# Patient Record
Sex: FEMALE | Race: WHITE
[De-identification: names, ages, dates, MRNs, and addresses within clinical notes are randomized per-mention and may not be internally consistent; named-entity substitution may affect disease eponyms.]

---

## 2017-03-05 ENCOUNTER — HOSPITAL ENCOUNTER (EMERGENCY)
Dept: HOSPITAL 62 - ER | Age: 42
LOS: 1 days | Discharge: HOME | End: 2017-03-06
Payer: OTHER GOVERNMENT

## 2017-03-05 DIAGNOSIS — M79.1: ICD-10-CM

## 2017-03-05 DIAGNOSIS — R51: ICD-10-CM

## 2017-03-05 DIAGNOSIS — R50.9: ICD-10-CM

## 2017-03-05 DIAGNOSIS — Z88.5: ICD-10-CM

## 2017-03-05 DIAGNOSIS — Z88.0: ICD-10-CM

## 2017-03-05 DIAGNOSIS — R42: ICD-10-CM

## 2017-03-05 DIAGNOSIS — R00.0: ICD-10-CM

## 2017-03-05 DIAGNOSIS — J40: Primary | ICD-10-CM

## 2017-03-05 DIAGNOSIS — R05: ICD-10-CM

## 2017-03-05 DIAGNOSIS — N39.0: ICD-10-CM

## 2017-03-05 LAB
ALBUMIN SERPL-MCNC: 4.1 G/DL (ref 3.5–5)
ALP SERPL-CCNC: 95 U/L (ref 38–126)
ALT SERPL-CCNC: 43 U/L (ref 9–52)
ANION GAP SERPL CALC-SCNC: 14 MMOL/L (ref 5–19)
APPEARANCE UR: CLEAR
AST SERPL-CCNC: 24 U/L (ref 14–36)
BASOPHILS NFR BLD MANUAL: 0 % (ref 0–2)
BILIRUB DIRECT SERPL-MCNC: 0 MG/DL (ref 0–0.3)
BILIRUB SERPL-MCNC: 0.5 MG/DL (ref 0.2–1.3)
BILIRUB UR QL STRIP: NEGATIVE
BUN SERPL-MCNC: 9 MG/DL (ref 7–20)
CALCIUM: 9.1 MG/DL (ref 8.4–10.2)
CHLORIDE SERPL-SCNC: 96 MMOL/L (ref 98–107)
CO2 SERPL-SCNC: 26 MMOL/L (ref 22–30)
CREAT SERPL-MCNC: 0.82 MG/DL (ref 0.52–1.25)
EOSINOPHIL NFR BLD MANUAL: 0 % (ref 0–6)
ERYTHROCYTE [DISTWIDTH] IN BLOOD BY AUTOMATED COUNT: 12.7 % (ref 11.5–14)
GLUCOSE SERPL-MCNC: 118 MG/DL (ref 75–110)
GLUCOSE UR STRIP-MCNC: NEGATIVE MG/DL
HCT VFR BLD CALC: 44.8 % (ref 36–47)
HGB BLD-MCNC: 14.7 G/DL (ref 12–15.5)
HGB HCT DIFFERENCE: -0.7
KETONES UR STRIP-MCNC: NEGATIVE MG/DL
MCH RBC QN AUTO: 27.8 PG (ref 27–33.4)
MCHC RBC AUTO-ENTMCNC: 32.9 G/DL (ref 32–36)
MCV RBC AUTO: 85 FL (ref 80–97)
NEUTS BAND NFR BLD MANUAL: 1 % (ref 3–5)
NITRITE UR QL STRIP: NEGATIVE
PH UR STRIP: 6 [PH] (ref 5–9)
POTASSIUM SERPL-SCNC: 3.9 MMOL/L (ref 3.6–5)
PROT SERPL-MCNC: 7.5 G/DL (ref 6.3–8.2)
PROT UR STRIP-MCNC: NEGATIVE MG/DL
RBC # BLD AUTO: 5.29 10^6/UL (ref 3.72–5.28)
RBC MORPH BLD: (no result)
SODIUM SERPL-SCNC: 135.7 MMOL/L (ref 137–145)
SP GR UR STRIP: 1
TOTAL CELLS COUNTED BLD: 100
TOXIC GRANULES BLD QL SMEAR: (no result)
UROBILINOGEN UR-MCNC: NEGATIVE MG/DL (ref ?–2)
VARIANT LYMPHS NFR BLD MANUAL: 11 % (ref 13–45)
WBC # BLD AUTO: 22.6 10^3/UL (ref 4–10.5)
WBC TOXIC VACUOLES BLD QL SMEAR: PRESENT

## 2017-03-05 PROCEDURE — 80053 COMPREHEN METABOLIC PANEL: CPT

## 2017-03-05 PROCEDURE — 81001 URINALYSIS AUTO W/SCOPE: CPT

## 2017-03-05 PROCEDURE — 94640 AIRWAY INHALATION TREATMENT: CPT

## 2017-03-05 PROCEDURE — 96374 THER/PROPH/DIAG INJ IV PUSH: CPT

## 2017-03-05 PROCEDURE — 70450 CT HEAD/BRAIN W/O DYE: CPT

## 2017-03-05 PROCEDURE — 99284 EMERGENCY DEPT VISIT MOD MDM: CPT

## 2017-03-05 PROCEDURE — 96361 HYDRATE IV INFUSION ADD-ON: CPT

## 2017-03-05 PROCEDURE — 87040 BLOOD CULTURE FOR BACTERIA: CPT

## 2017-03-05 PROCEDURE — 36415 COLL VENOUS BLD VENIPUNCTURE: CPT

## 2017-03-05 PROCEDURE — 96375 TX/PRO/DX INJ NEW DRUG ADDON: CPT

## 2017-03-05 PROCEDURE — 71020: CPT

## 2017-03-05 PROCEDURE — 85025 COMPLETE CBC W/AUTO DIFF WBC: CPT

## 2017-03-05 PROCEDURE — 87804 INFLUENZA ASSAY W/OPTIC: CPT

## 2017-03-05 RX ADMIN — SODIUM CHLORIDE PRN ML: 9 INJECTION, SOLUTION INTRAVENOUS at 21:35

## 2017-03-05 RX ADMIN — SODIUM CHLORIDE PRN ML: 9 INJECTION, SOLUTION INTRAVENOUS at 23:30

## 2017-03-05 NOTE — ER DOCUMENT REPORT
ED Medical Screen (RME)





- General


Chief Complaint: Fever


Stated Complaint: FEVER, COUGH, DIZZINESS


Notes: 


Patient states that she was sick last week with a low-grade fever and cough and 

cold.  States was doing fine this morning then all of a sudden she developed a 

fever, cough, congestion, and dizziness.  Denies nausea vomiting or diarrhea.  

Does complain of shortness of breath with coughing.





I have greeted and performed a rapid initial assessment of this patient.  A 

comprehensive ED assessment and evaluation of the patient, analysis of test 

results and completion of the medical decision making process will be conducted 

by additional ED providers.


TRAVEL OUTSIDE OF THE [place] IN LAST 30 DAYS: No





- Related Data


Allergies/Adverse Reactions: 


 





codeine [Codeine] Allergy (Verified 03/05/17 19:51)


 


hydrocodone [Hydrocodone] Allergy (Verified 03/05/17 19:51)


 


lactose [Lactose] Allergy (Verified 03/05/17 19:51)


 


Penicillins Allergy (Verified 03/05/17 19:51)


 











Past Medical History





- Past Medical History


Cardiac Medical History: Reports: Hx Hypercholesterolemia


Renal/ Medical History: Denies: Hx Peritoneal Dialysis


Psychiatric Medical History: Reports: Hx Depression


Past Surgical History: Reports: Hx Cholecystectomy





- Immunizations


Hx Diphtheria, Pertussis, Tetanus Vaccination: No





Physical Exam





- Vital signs


Vitals: 





 











Temp Pulse Resp BP Pulse Ox


 


 98.5 F   121 H  20   135/90 H  97 


 


 03/05/17 19:48  03/05/17 19:48  03/05/17 19:48  03/05/17 19:48  03/05/17 19:48














- Respiratory


Respiratory status: No respiratory distress - Mild expiratory wheezing noted 

left upper lobe.  No respiratory distress.





Course





- Vital Signs


Vital signs: 





 











Temp Pulse Resp BP Pulse Ox


 


 98.5 F   121 H  20   135/90 H  97 


 


 03/05/17 19:48  03/05/17 19:48  03/05/17 19:48  03/05/17 19:48  03/05/17 19:48

## 2017-03-05 NOTE — ER DOCUMENT REPORT
ED General





- General


Chief Complaint: Fever


Stated Complaint: FEVER, COUGH, DIZZINESS


Time seen by provider: 20:24


Mode of Arrival: Ambulatory


Information source: Patient


TRAVEL OUTSIDE OF THE [place] IN LAST 30 DAYS: No





- HPI


Patient complains to provider of: fever, dizziness, headache, nonproductive 

cough


Onset: Last week


Onset/Duration: Intermittent


Quality of pain: Achy


Severity: Mild


Pain Level: 2


Associated symptoms: Body/muscle aches, Chills, Nonproductive cough, Fever, 

Headache


Exacerbated by: Denies


Relieved by: Denies


Similar symptoms previously: Yes


Recently seen / treated by doctor: No


Notes: 


Patient is a 41-year-old female who presents to the emergency room complaining 

of fever, headache, nonproductive cough, dizziness, symptoms started last week, 

then went away for to 3 days, and came back yesterday, she denies any nausea, 

vomiting, diarrhea, no abdominal pain, no sick contacts, no recent traveling, 

patient denies stiff neck or neck pain, headache is in the frontal region





- Related Data


Allergies/Adverse Reactions: 


 





codeine [Codeine] Allergy (Verified 03/05/17 19:51)


 


hydrocodone [Hydrocodone] Allergy (Verified 03/05/17 19:51)


 


lactose [Lactose] Allergy (Verified 03/05/17 19:51)


 


Penicillins Allergy (Verified 03/05/17 19:51)


 











Past Medical History





- General


Information source: Patient





- Social History


Smoking Status: Unknown if Ever Smoked


Family History: Reviewed & Not Pertinent


Patient has suicidal ideation: No


Patient has homicidal ideation: No





- Past Medical History


Cardiac Medical History: Reports: Hx Hypercholesterolemia


Renal/ Medical History: Denies: Hx Peritoneal Dialysis


Psychiatric Medical History: Reports: Hx Depression


Past Surgical History: Reports: Hx Cholecystectomy





- Immunizations


Hx Diphtheria, Pertussis, Tetanus Vaccination: No





Review of Systems





- Review of Systems


Constitutional: See HPI


EENT: No symptoms reported


Cardiovascular: No symptoms reported


Respiratory: Cough


Gastrointestinal: No symptoms reported


Genitourinary: No symptoms reported


Female Genitourinary: No symptoms reported


Musculoskeletal: See HPI


Skin: No symptoms reported


Hematologic/Lymphatic: No symptoms reported


Neurological/Psychological: Headaches


-: Yes All other systems reviewed and negative





Physical Exam





- Vital signs


Vitals: 


 











Temp Pulse Resp BP Pulse Ox


 


 98.5 F   121 H  20   135/90 H  97 


 


 03/05/17 19:48  03/05/17 19:48  03/05/17 19:48  03/05/17 19:48  03/05/17 19:48











Interpretation: Tachycardic





- General


General appearance: Appears well, Alert





- HEENT


Head: Normocephalic, Atraumatic


Eyes: Normal


Conjunctiva: Normal


Extraocular movements intact: Yes


Eyelashes: Normal


Pupils: PERRL


Ears: Normal


External canal: Normal


Tympanic membrane: Normal


Sinus: Frontal, Tenderness


Nasal: Normal


Mouth/Lips: Normal


Mucous membranes: Normal


Pharynx: Normal





- Respiratory


Respiratory status: No respiratory distress


Chest status: Nontender


Breath sounds: Normal


Chest palpation: Normal





- Cardiovascular


Rhythm: Regular


Heart sounds: Normal auscultation


Murmur: No





- Abdominal


Inspection: Normal


Distension: No distension


Bowel sounds: Normal


Tenderness: Nontender


Organomegaly: No organomegaly





- Back


Back: Normal, Nontender





- Extremities


General upper extremity: Normal inspection, Nontender, Normal color, Normal ROM

, Normal temperature


General lower extremity: Normal inspection, Nontender, Normal color, Normal ROM

, Normal temperature, Normal weight bearing.  No: Luisa's sign





- Neurological


Neuro grossly intact: Yes


Cognition: Normal


Orientation: AAOx4


Velma Coma Scale Eye Opening: Spontaneous


Albright Coma Scale Verbal: Oriented


Albright Coma Scale Motor: Obeys Commands


Velma Coma Scale Total: 15


Speech: Normal


Motor strength normal: LUE, RUE, LLE, RLE


Sensory: Normal





- Psychological


Associated symptoms: Normal affect, Normal mood





- Skin


Skin Temperature: Warm


Skin Moisture: Dry


Skin Color: Normal





Course





- Re-evaluation


Re-evalutation: 


03/05/17 23:58


Patient resting comfortably, reports feeling much better, lab and imaging 

findings were discussed with patient at bedside, she is noted to have mild 

urinary tract infection, also has upper respiratory symptoms, possible sinusitis

, therefore she was started on Levaquin, advised to get plenty of rest, drink 

plenty fluids, follow up with her primary care provider or return if symptoms 

worsen, patient acknowledges understanding and agreement with this plan











- Vital Signs


Vital signs: 


 











Temp Pulse Resp BP Pulse Ox


 


 98.5 F   121 H  15   134/88 H  98 


 


 03/05/17 19:48  03/05/17 19:48  03/06/17 00:01  03/06/17 00:01  03/06/17 00:01














- Laboratory


Result Diagrams: 


 03/05/17 21:30





 03/05/17 21:30


Laboratory results interpreted by me: 


 











  03/05/17 03/05/17 03/05/17





  21:30 21:30 23:07


 


WBC  22.6 H  


 


RBC  5.29 H  


 


Seg Neuts % (Manual)  85 H  


 


Band Neutrophils %  1 L  


 


Lymphocytes % (Manual)  11 L  


 


Abs Neuts (Manual)  19.4 H  


 


Sodium   135.7 L 


 


Chloride   96 L 


 


Glucose   118 H 


 


Urine Blood    SMALL H


 


Ur Leukocyte Esterase    TRACE H














- Diagnostic Test


Radiology reviewed: Image reviewed, Reports reviewed





Discharge





- Discharge


Clinical Impression: 


 Bronchitis





Urinary tract infection


Qualifiers:


 Urinary tract infection type: site unspecified Hematuria presence: without 

hematuria Qualified Code(s): N39.0 - Urinary tract infection, site not specified





Condition: Stable


Disposition: HOME, SELF-CARE


Instructions:  Urinary Tract Infection ([facility]), Bronchitis ([facility])


Additional Instructions: 


Follow up with your primary care provider in one to 2 days.  Return to the 

emergency room immediately if symptoms worsen or any additional concerns.


Prescriptions: 


Levofloxacin [Levaquin 750 mg Tablet] 750 mg PO DAILY #6 tablet


Forms:  Return to Work

## 2017-03-06 VITALS — SYSTOLIC BLOOD PRESSURE: 142 MMHG | DIASTOLIC BLOOD PRESSURE: 84 MMHG

## 2017-09-17 ENCOUNTER — HOSPITAL ENCOUNTER (EMERGENCY)
Dept: HOSPITAL 62 - ER | Age: 42
Discharge: HOME | End: 2017-09-17
Payer: OTHER GOVERNMENT

## 2017-09-17 VITALS — SYSTOLIC BLOOD PRESSURE: 139 MMHG | DIASTOLIC BLOOD PRESSURE: 88 MMHG

## 2017-09-17 DIAGNOSIS — S93.401A: Primary | ICD-10-CM
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
## 2017-09-17 DIAGNOSIS — M25.551: ICD-10-CM

## 2017-09-17 DIAGNOSIS — M25.571: ICD-10-CM

## 2017-09-17 PROCEDURE — L1902 AFO ANKLE GAUNTLET PRE OTS: HCPCS

## 2017-09-17 PROCEDURE — 73610 X-RAY EXAM OF ANKLE: CPT

## 2017-09-17 PROCEDURE — 29515 APPLICATION SHORT LEG SPLINT: CPT

## 2017-09-17 PROCEDURE — 99283 EMERGENCY DEPT VISIT LOW MDM: CPT

## 2017-09-17 PROCEDURE — 2W3QX1Z IMMOBILIZATION OF RIGHT LOWER LEG USING SPLINT: ICD-10-PCS | Performed by: EMERGENCY MEDICINE

## 2017-09-17 PROCEDURE — 73502 X-RAY EXAM HIP UNI 2-3 VIEWS: CPT

## 2017-09-17 NOTE — ER DOCUMENT REPORT
HPI





- HPI


Patient complains to provider of: fell off horse


Onset: Yesterday - after lunch


Onset/Duration: Sudden


Quality of pain: Throbbing


Pain Level: 4


Context: 





42-year-old female fell off her horse about 5 feet onto her right hip and 

twisted her right ankle.  She was able to continue to walk with her riding 

boot.  She is more sore this morning.  She works in the business office of 

[employer].  She want to make sure nothing was broken with x-rays 

today.  She has crutches at home and does not want any crutches from the 

emergency department.


Associated Symptoms: None


Exacerbated by: Walking


Relieved by: Denies


Similar symptoms previously: No


Recently seen / treated by doctor: No





- ROS


ROS below otherwise negative: Yes


Systems Reviewed and Negative: Yes All other systems reviewed and negative





- REPRODUCTIVE


LMP: 08/30/2017


Reproductive: DENIES: Pregnant:





Past Medical History





- General


Information source: Patient





- Social History


Smoking Status: Never Smoker


Frequency of alcohol use: Occasional


Drug Abuse: None


Lives with: Family


Family History: Reviewed & Not Pertinent





- Past Medical History


Cardiac Medical History: Reports: Hx Hypercholesterolemia


Renal/ Medical History: Denies: Hx Peritoneal Dialysis


Psychiatric Medical History: Reports: Hx Depression


Past Surgical History: Reports: Hx Cholecystectomy





- Immunizations


Hx Diphtheria, Pertussis, Tetanus Vaccination: No





Vertical Provider Document





- CONSTITUTIONAL


Agree With Documented VS: Yes


Exam Limitations: No Limitations


General Appearance: No Apparent Distress





- INFECTION CONTROL


TRAVEL OUTSIDE OF THE [place] IN LAST 30 DAYS: No





- HEENT


HEENT: Normocephalic





- NECK


Neck: Supple





- RESPIRATORY


O2 Sat by Pulse Oximetry: 96





- MUSCULOSKELETAL/EXTREMETIES


Musculoskeletal/Extremeties: MAEW, FROM, Tender, Edema, Eccymosis - inferior to 

lateral right malleolus, non tender bone


Notes: 





tender right lateral hip, FROM, no bruise





- NEURO


Level of Consciousness: Awake, Alert, Appropriate


Motor/Sensory: No Motor Deficit, No Sensory Deficit





- DERM


Integumentary: Warm, Dry





Course





- Re-evaluation


Re-evalutation: 





09/17/17 06:46


prelim ankle xray is negative, hip neg per rad





- Vital Signs


Vital signs: 


 











Temp Pulse Resp BP Pulse Ox


 


 98.2 F   99   16   139/88 H  96 


 


 09/17/17 05:40  09/17/17 05:40  09/17/17 05:40  09/17/17 05:40  09/17/17 05:40














Procedures





- Immobilization


  ** Right Ankle


Time completed: 06:33


Pre-Proc Neuro Vasc Exam: Normal


Immobilizer type: Ankle stirrup


Performed by: PCT


Post-Proc Neuro Vasc Exam: Normal


Alignment checked and good: Yes





Discharge





- Discharge


Clinical Impression: 


 right hip contusion





Right ankle sprain


Qualifiers:


 Encounter type: initial encounter Involved ligament of ankle: unspecified 

ligament Qualified Code(s): S93.401A - Sprain of unspecified ligament of right 

ankle, initial encounter





Condition: Good


Disposition: HOME, SELF-CARE


Instructions:  Sprained Ankle ([facility]), Contusion ([facility]), Ankle Stirrup Splint ([facility])


Additional Instructions: 


splint this week


final ankle xray is pending, prelim is negative


to er any concerns


motrin for pain





Please complete the patient satisfaction survey if you get one, and return it.. 

If you do not receive a survey,  then you can go to the [facility] website, [email or web address] 

and place your comments about your very good care. Thank you very much. It was 

a pleasure being your medical provider today.


Referrals: 


RENALDO FERRARA MD [ACTIVE STAFF] - Follow up as needed

## 2017-09-17 NOTE — RADIOLOGY REPORT (SQ)
EXAM DESCRIPTION:  ANKLE RIGHT COMPLETE



COMPLETED DATE/TIME:  9/17/2017 6:16 am



REASON FOR STUDY:  fall injury .  Pain right lateral malleolus status post fall off the horse.



COMPARISON:  Right ankle x-ray 1/6/2011.  Right foot x-ray 7/12/2010.



NUMBER OF VIEWS:  Three views.



TECHNIQUE:  AP, lateral, and oblique radiographic images acquired of the right ankle.



LIMITATIONS:  None.



FINDINGS:  MINERALIZATION: Normal.

BONES: No evidence for acute fracture or dislocation.  Redemonstration of linear lucency at the dorsu
m of the navicular bone, probably due to remote trauma.  Bony spur at the calcaneus.

JOINTS: Small effusion at the ankle.

SOFT TISSUES: Mild soft tissue swelling at the ankle.



IMPRESSION:  No radiographic evidence of acute fracture.

Small joint effusion at the ankle.  Mild soft tissue swelling.  If there is clinical concern for liga
mentous injury, evaluation with MRI can be obtained.



TECHNICAL DOCUMENTATION:  JOB ID:  [id number]

OH-64

 2011 [employer]- All Rights Reserved

## 2017-09-17 NOTE — RADIOLOGY REPORT (SQ)
EXAM DESCRIPTION:  HIP RIGHT AP/LATERAL



COMPLETED DATE/TIME:  9/17/2017 6:16 am



REASON FOR STUDY:  fall injury



COMPARISON:  None.



NUMBER OF VIEWS:  Two views.



TECHNIQUE:  AP pelvis and additional frog-leg view of the right hip.



LIMITATIONS:  None.



FINDINGS:  There is no acute fracture or dislocation.  The pelvic ring is intact.  The bilateral hip 
joints are maintained.  The soft tissues are unremarkable.  No radiopaque foreign body.



IMPRESSION:  No radiographic evidence of acute injury.



TECHNICAL DOCUMENTATION:  JOB ID:  [id number]

OH-64

 2011 [employer]- All Rights Reserved

## 2018-07-26 ENCOUNTER — HOSPITAL ENCOUNTER (EMERGENCY)
Dept: HOSPITAL 62 - ER | Age: 43
Discharge: HOME | End: 2018-07-26
Payer: COMMERCIAL

## 2018-07-26 VITALS — DIASTOLIC BLOOD PRESSURE: 97 MMHG | SYSTOLIC BLOOD PRESSURE: 182 MMHG

## 2018-07-26 DIAGNOSIS — Z90.49: ICD-10-CM

## 2018-07-26 DIAGNOSIS — E78.00: ICD-10-CM

## 2018-07-26 DIAGNOSIS — K83.8: ICD-10-CM

## 2018-07-26 DIAGNOSIS — Z88.6: ICD-10-CM

## 2018-07-26 DIAGNOSIS — Z88.0: ICD-10-CM

## 2018-07-26 DIAGNOSIS — R10.11: Primary | ICD-10-CM

## 2018-07-26 LAB
ADD MANUAL DIFF: NO
ALBUMIN SERPL-MCNC: 4 G/DL (ref 3.5–5)
ALP SERPL-CCNC: 76 U/L (ref 38–126)
ALT SERPL-CCNC: 20 U/L (ref 9–52)
ANION GAP SERPL CALC-SCNC: 16 MMOL/L (ref 5–19)
AST SERPL-CCNC: 19 U/L (ref 14–36)
BASOPHILS # BLD AUTO: 0.1 10^3/UL (ref 0–0.2)
BASOPHILS NFR BLD AUTO: 0.7 % (ref 0–2)
BILIRUB DIRECT SERPL-MCNC: 0.2 MG/DL (ref 0–0.4)
BILIRUB SERPL-MCNC: 0.2 MG/DL (ref 0.2–1.3)
BUN SERPL-MCNC: 12 MG/DL (ref 7–20)
CALCIUM: 9.6 MG/DL (ref 8.4–10.2)
CHLORIDE SERPL-SCNC: 103 MMOL/L (ref 98–107)
CO2 SERPL-SCNC: 25 MMOL/L (ref 22–30)
EOSINOPHIL # BLD AUTO: 0.3 10^3/UL (ref 0–0.6)
EOSINOPHIL NFR BLD AUTO: 2.5 % (ref 0–6)
ERYTHROCYTE [DISTWIDTH] IN BLOOD BY AUTOMATED COUNT: 13.5 % (ref 11.5–14)
GLUCOSE SERPL-MCNC: 117 MG/DL (ref 75–110)
HCT VFR BLD CALC: 42.2 % (ref 36–47)
HGB BLD-MCNC: 14.5 G/DL (ref 12–15.5)
LIPASE SERPL-CCNC: 136.8 U/L (ref 23–300)
LYMPHOCYTES # BLD AUTO: 3.2 10^3/UL (ref 0.5–4.7)
LYMPHOCYTES NFR BLD AUTO: 31.7 % (ref 13–45)
MCH RBC QN AUTO: 29.3 PG (ref 27–33.4)
MCHC RBC AUTO-ENTMCNC: 34.4 G/DL (ref 32–36)
MCV RBC AUTO: 85 FL (ref 80–97)
MONOCYTES # BLD AUTO: 0.6 10^3/UL (ref 0.1–1.4)
MONOCYTES NFR BLD AUTO: 6 % (ref 3–13)
NEUTROPHILS # BLD AUTO: 6.1 10^3/UL (ref 1.7–8.2)
NEUTS SEG NFR BLD AUTO: 59.1 % (ref 42–78)
PLATELET # BLD: 397 10^3/UL (ref 150–450)
POTASSIUM SERPL-SCNC: 4.4 MMOL/L (ref 3.6–5)
PROT SERPL-MCNC: 7.7 G/DL (ref 6.3–8.2)
RBC # BLD AUTO: 4.94 10^6/UL (ref 3.72–5.28)
SODIUM SERPL-SCNC: 143.5 MMOL/L (ref 137–145)
TOTAL CELLS COUNTED % (AUTO): 100 %
WBC # BLD AUTO: 10.2 10^3/UL (ref 4–10.5)

## 2018-07-26 PROCEDURE — 80076 HEPATIC FUNCTION PANEL: CPT

## 2018-07-26 PROCEDURE — 80048 BASIC METABOLIC PNL TOTAL CA: CPT

## 2018-07-26 PROCEDURE — 96372 THER/PROPH/DIAG INJ SC/IM: CPT

## 2018-07-26 PROCEDURE — 76705 ECHO EXAM OF ABDOMEN: CPT

## 2018-07-26 PROCEDURE — 83690 ASSAY OF LIPASE: CPT

## 2018-07-26 PROCEDURE — 99284 EMERGENCY DEPT VISIT MOD MDM: CPT

## 2018-07-26 PROCEDURE — 85025 COMPLETE CBC W/AUTO DIFF WBC: CPT

## 2018-07-26 PROCEDURE — 36415 COLL VENOUS BLD VENIPUNCTURE: CPT

## 2018-07-26 NOTE — ER DOCUMENT REPORT
ED GI/





- General


Chief Complaint: Abdominal Pain


Stated Complaint: ABDOMINAL PAIN


Time Seen by Provider: 07/26/18 14:51


Mode of Arrival: Ambulatory


Information source: Patient


Cannot obtain history due to: Unstable vital signs


Notes: 





Chief complaint: abdominal pain:








History of complain:( obtained from----patient) 43 years old female with a 

history of cholecystectomy, 8 cheeseburger started having right upper quadrant 

pain which was very sharp, 10/10 intensity, therefore presented to the ED.


Pain was associated with nausea no vomiting.  No fever chills or other 

constitutional symptoms








Onset: As above


Duration: Sudden last few hours


Severity: Moderate to severe


Quality: Sharp


Context: Unknown


Exacerbating factor and relieving factors: None











REVIEW OF SYSTEMS:


CONSTITUTIONAL :  Denies fever,  chills, or sweats.  Denies recent illness.


EENT:   Denies eye, ear, throat, or mouth pain or symptoms.  Denies nasal or 

sinus congestion or discharge.  Denies throat, tongue, or mouth swelling or 

difficulty swallowing.


CARDIOVASCULAR:  Denies chest pain.  Denies palpitations or racing or irregular 

heart beat.  Denies ankle edema.


RESPIRATORY:  Denies cough, cold, or chest congestion.  Denies shortness of 

breath, difficulty breathing, or wheezing.


GASTROINTESTINAL:  Denies  distention.  Denies , vomiting, or diarrhea.  Denies 

blood in vomitus, stools, or per rectum.  Denies black, tarry stools.  Denies 

constipation. 


GENITOURINARY:  Denies difficulty urinating, painful urination, burning, 

frequency, blood in urine, or discharge.


FEMALE  GENITOURINARY:  Denies vaginal bleeding, heavy or abnormal periods, 

irregular periods.  Denies vaginal discharge or odor. 


MUSCULOSKELETAL:  Denies back or neck pain or stiffness.  Denies joint pain or 

swelling.


SKIN:   Denies rash, lesions or sores.


HEMATOLOGIC :   Denies easy bruising or bleeding.


LYMPHATIC:  Denies swollen, enlarged glands.


NEUROLOGICAL:  Denies confusion or altered mental status.  Denies passing out 

or loss of consciousness.  Denies dizziness or lightheadedness.  Denies 

headache.  Denies weakness or paralysis or loss of use of either side.  Denies 

problems with gait or speech.  Denies sensory loss, numbness, or tingling.  

Denies seizures.


PSYCHIATRIC:  Denies anxiety or stress.  Denies depression, suicidal ideation, 

or homicidal ideation.





ALL OTHER SYSTEMS REVIEWED AND NEGATIVE.











PHYSICAL EXAMINATION:





GENERAL: Well-appearing, well-nourished and in no acute distress.  Obesity





HEAD: Atraumatic, normocephalic.





EYES: Pupils equal round and reactive to light, extraocular movements intact, 

conjunctiva are normal.





ENT: Nares patent, oropharynx clear without exudates.  Moist mucous membranes.





NECK: Normal range of motion, supple without lymphadenopathy





LUNGS: Breath sounds clear to auscultation bilaterally and equal.  No wheezes 

rales or rhonchi.





HEART: Regular rate and rhythm without murmurs





ABDOMEN: Soft, right upper quadrant tenderness, nondistended abdomen.  No 

guarding, no rebound.  No masses appreciated.





Female : deferred





Musculoskeletal: Normal range of motion, no pitting or edema.  No cyanosis.





NEUROLOGICAL: Cranial nerves grossly intact.  Normal speech, normal gait.  

Normal sensory, motor exams





PSYCH: Normal mood, normal affect.





SKIN: Warm, Dry, normal turgor, no rashes or lesions noted.

















Dictation was performed using Dragon voice recognition software


TRAVEL OUTSIDE OF THE [place] IN LAST 30 DAYS: No





- HPI


Notes: 





07/26/18 14:54


Dictated





- Related Data


Allergies/Adverse Reactions: 


 





acetaminophen [From Vicodin] Allergy (Verified 07/26/18 14:50)


 


codeine [Codeine] Allergy (Verified 07/26/18 14:28)


 


hydrocodone [Hydrocodone] Allergy (Verified 07/26/18 14:28)


 


lactose [Lactose] Allergy (Verified 07/26/18 14:28)


 


Penicillins Allergy (Verified 07/26/18 14:28)


 











Past Medical History





- Social History


Smoking Status: Never Smoker


Chew tobacco use (# tins/day): No


Frequency of alcohol use: None


Drug Abuse: None


Family History: Reviewed & Not Pertinent


Patient has suicidal ideation: No


Patient has homicidal ideation: No





- Past Medical History


Cardiac Medical History: Reports: Hx Hypercholesterolemia


Renal/ Medical History: Denies: Hx Peritoneal Dialysis


Psychiatric Medical History: Reports: Hx Depression


Past Surgical History: Reports: Hx Cholecystectomy





- Immunizations


Hx Diphtheria, Pertussis, Tetanus Vaccination: No





Review of Systems





- Review of Systems


Notes: 





Dictated





Physical Exam





- Vital signs


Vitals: 


 











Temp Pulse Resp BP Pulse Ox


 


 98.0 F   99   22 H  176/86 H  98 


 


 07/26/18 14:33  07/26/18 14:33  07/26/18 14:33  07/26/18 14:33  07/26/18 14:33














- Notes


Notes: 





Dictated





Course





- Vital Signs


Vital signs: 


 











Temp Pulse Resp BP Pulse Ox


 


 98.0 F   99   22 H  176/86 H  98 


 


 07/26/18 14:33  07/26/18 14:33  07/26/18 14:33  07/26/18 14:33  07/26/18 14:33














- Laboratory


Result Diagrams: 


 07/26/18 15:00





 07/26/18 15:00


Laboratory results interpreted by me: 


 











  07/26/18





  15:00


 


Glucose  117 H














- Diagnostic Test


Radiology reviewed: Reports reviewed - Ultrasound of the abdomen reported by 

radiologist as fatty liver





Discharge





- Discharge


Clinical Impression: 


 Acute abdominal pain, Biliary colic symptom





Condition: Fair


Disposition: HOME, SELF-CARE


Prescriptions: 


Ketorolac Tromethamine [Toradol 10 mg Tablet] 10 mg PO Q6HP PRN #14 tablet


 PRN Reason: 


Dicyclomine HCl [Bentyl 10 mg Capsule] 1 cap PO TID #30 cap


Referrals: 


OSMAN MARCIAL MD [Primary Care Provider] - Follow up as needed

## 2018-07-26 NOTE — RADIOLOGY REPORT (SQ)
EXAM DESCRIPTION:  U/S ABDOMEN LIMITED W/O DOP



COMPLETED DATE/TIME:  7/26/2018 4:28 pm



REASON FOR STUDY:  Liver



COMPARISON:  None.



TECHNIQUE:  Dynamic and static grayscale images acquired of the right upper quadrant and recorded on 
PACS. Additional selected color Doppler and spectral images recorded.



LIMITATIONS:  Study limited due to acoustical interference from fat or from air in the bowel.



FINDINGS:  PANCREAS: Partsof the pancreas poorly seen secondary to acoustical interference from fat o
r from air in the bowel.

LIVER: Echotexture is coarse with increased echogenicity consistent with fatty infiltration.  No mass
es.

LIVER VASCULATURE: Normal directional flow of the main portal vein and hepatic veins.

GALLBLADDER: Surgically absent.

ULTRASOUND-DETECTED ALCOCER'S SIGN: Not applicable.

INTRAHEPATIC DUCTS AND COMMON DUCT: CBD and intrahepatic ducts normal caliber. No filling defects.

INFERIOR VENA CAVA: Normal flow.

AORTA: No aneurysm.

RIGHT KIDNEY: Normal size. Normal echogenicity. No solid or suspicious masses. No hydronephrosis. No 
calcifications.

PERITONEAL CAVITY AND RIGHT PLEURAL SPACE: No ascites or effusions.

OTHER: No other significant finding.



IMPRESSION:  FATTY LIVER. PANCREAS PARTIALLY OBSCURED. OTHERWISE NORMAL RIGHT UPPER QUADRANT ULTRASOU
ND.



TECHNICAL DOCUMENTATION:  JOB ID:  [id number]

 2011 "[employer]- All Rights Reserved



Reading location - IP/workstation name: [facility]